# Patient Record
Sex: FEMALE | Race: WHITE | Employment: STUDENT | URBAN - METROPOLITAN AREA
[De-identification: names, ages, dates, MRNs, and addresses within clinical notes are randomized per-mention and may not be internally consistent; named-entity substitution may affect disease eponyms.]

---
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
## 2024-05-20 ENCOUNTER — LAB ENCOUNTER (OUTPATIENT)
Dept: LAB | Age: 19
End: 2024-05-20
Attending: PEDIATRICS

## 2024-05-20 DIAGNOSIS — Z13.228 SCREENING FOR METABOLIC DISORDER: ICD-10-CM

## 2024-05-20 DIAGNOSIS — J45.20 MILD INTERMITTENT ASTHMA WITHOUT COMPLICATION (HCC): ICD-10-CM

## 2024-05-20 DIAGNOSIS — Z13.0 SCREENING FOR DEFICIENCY ANEMIA: ICD-10-CM

## 2024-05-20 DIAGNOSIS — Z13.228 SCREENING FOR METABOLIC DISORDER: Primary | ICD-10-CM

## 2024-05-20 DIAGNOSIS — Z13.29 SCREENING FOR THYROID DISORDER: ICD-10-CM

## 2024-05-20 DIAGNOSIS — Z13.220 SCREENING FOR HYPERLIPIDEMIA: ICD-10-CM

## 2024-05-20 LAB
BASOPHILS # BLD AUTO: 0.04 X10(3) UL (ref 0–0.2)
BASOPHILS NFR BLD AUTO: 0.5 %
CHOLEST SERPL-MCNC: 124 MG/DL (ref ?–200)
DEPRECATED HBV CORE AB SER IA-ACNC: 13 NG/ML
EOSINOPHIL # BLD AUTO: 0.48 X10(3) UL (ref 0–0.7)
EOSINOPHIL NFR BLD AUTO: 6.5 %
ERYTHROCYTE [DISTWIDTH] IN BLOOD BY AUTOMATED COUNT: 12.9 %
FASTING PATIENT LIPID ANSWER: NO
HCT VFR BLD AUTO: 40.2 %
HDLC SERPL-MCNC: 66 MG/DL (ref 40–59)
HGB BLD-MCNC: 13.7 G/DL
IMM GRANULOCYTES # BLD AUTO: 0.01 X10(3) UL (ref 0–1)
IMM GRANULOCYTES NFR BLD: 0.1 %
IRON SATN MFR SERPL: 15 %
IRON SERPL-MCNC: 64 UG/DL
LDLC SERPL CALC-MCNC: 38 MG/DL (ref ?–100)
LYMPHOCYTES # BLD AUTO: 1.8 X10(3) UL (ref 1–4)
LYMPHOCYTES NFR BLD AUTO: 24.3 %
MAGNESIUM SERPL-MCNC: 2.1 MG/DL (ref 1.6–2.6)
MCH RBC QN AUTO: 30 PG (ref 26–34)
MCHC RBC AUTO-ENTMCNC: 34.1 G/DL (ref 31–37)
MCV RBC AUTO: 88.2 FL
MONOCYTES # BLD AUTO: 0.52 X10(3) UL (ref 0.1–1)
MONOCYTES NFR BLD AUTO: 7 %
NEUTROPHILS # BLD AUTO: 4.55 X10 (3) UL (ref 1.5–7.7)
NEUTROPHILS # BLD AUTO: 4.55 X10(3) UL (ref 1.5–7.7)
NEUTROPHILS NFR BLD AUTO: 61.6 %
NONHDLC SERPL-MCNC: 58 MG/DL (ref ?–130)
PLATELET # BLD AUTO: 246 10(3)UL (ref 150–450)
RBC # BLD AUTO: 4.56 X10(6)UL
TIBC SERPL-MCNC: 428 UG/DL (ref 240–450)
TRANSFERRIN SERPL-MCNC: 287 MG/DL (ref 200–360)
TRIGL SERPL-MCNC: 116 MG/DL (ref 30–149)
TSI SER-ACNC: 1.89 MIU/ML (ref 0.36–3.74)
VLDLC SERPL CALC-MCNC: 16 MG/DL (ref 0–30)
WBC # BLD AUTO: 7.4 X10(3) UL (ref 4–11)

## 2024-05-20 PROCEDURE — 82785 ASSAY OF IGE: CPT

## 2024-05-20 PROCEDURE — 86003 ALLG SPEC IGE CRUDE XTRC EA: CPT

## 2024-05-20 PROCEDURE — 83540 ASSAY OF IRON: CPT

## 2024-05-20 PROCEDURE — 83550 IRON BINDING TEST: CPT

## 2024-05-20 PROCEDURE — 85025 COMPLETE CBC W/AUTO DIFF WBC: CPT

## 2024-05-20 PROCEDURE — 84443 ASSAY THYROID STIM HORMONE: CPT

## 2024-05-20 PROCEDURE — 83735 ASSAY OF MAGNESIUM: CPT

## 2024-05-20 PROCEDURE — 80061 LIPID PANEL: CPT

## 2024-05-20 PROCEDURE — 82728 ASSAY OF FERRITIN: CPT

## 2024-05-20 PROCEDURE — 36415 COLL VENOUS BLD VENIPUNCTURE: CPT

## 2024-05-22 LAB
A ALTERNATA IGE QN: <0.1 KUA/L (ref ?–0.1)
A FUMIGATUS IGE QN: <0.1 KUA/L (ref ?–0.1)
AMER SYCAMORE IGE QN: <0.1 KUA/L (ref ?–0.1)
BERMUDA GRASS IGE QN: <0.1 KUA/L (ref ?–0.1)
BOXELDER IGE QN: <0.1 KUA/L (ref ?–0.1)
C HERBARUM IGE QN: <0.1 KUA/L (ref ?–0.1)
CALIF WALNUT IGE QN: <0.1 KUA/L (ref ?–0.1)
CAT DANDER IGE QN: 1.34 KUA/L (ref ?–0.1)
CMN PIGWEED IGE QN: <0.1 KUA/L (ref ?–0.1)
COMMON RAGWEED IGE QN: <0.1 KUA/L (ref ?–0.1)
COTTONWOOD IGE QN: 0.17 KUA/L (ref ?–0.1)
D FARINAE IGE QN: <0.1 KUA/L (ref ?–0.1)
D PTERONYSS IGE QN: <0.1 KUA/L (ref ?–0.1)
DOG DANDER IGE QN: 44.3 KUA/L (ref ?–0.1)
IGE SERPL-ACNC: 249 KU/L (ref 2–214)
M RACEMOSUS IGE QN: <0.1 KUA/L (ref ?–0.1)
MARSH ELDER IGE QN: <0.1 KUA/L (ref ?–0.1)
MOUSE EPITH IGE QN: 0.65 KUA/L (ref ?–0.1)
MT JUNIPER IGE QN: <0.1 KUA/L (ref ?–0.1)
P NOTATUM IGE QN: <0.1 KUA/L (ref ?–0.1)
PECAN/HICK TREE IGE QN: 0.11 KUA/L (ref ?–0.1)
ROACH IGE QN: <0.1 KUA/L (ref ?–0.1)
SALTWORT IGE QN: <0.1 KUA/L (ref ?–0.1)
SILVER BIRCH IGE QN: <0.1 KUA/L (ref ?–0.1)
TIMOTHY IGE QN: <0.1 KUA/L (ref ?–0.1)
WHITE ASH IGE QN: <0.1 KUA/L (ref ?–0.1)
WHITE ELM IGE QN: <0.1 KUA/L (ref ?–0.1)
WHITE MULBERRY IGE QN: <0.1 KUA/L (ref ?–0.1)
WHITE OAK IGE QN: <0.1 KUA/L (ref ?–0.1)

## 2024-06-24 ENCOUNTER — OFFICE VISIT (OUTPATIENT)
Facility: CLINIC | Age: 19
End: 2024-06-24
Payer: COMMERCIAL

## 2024-06-24 VITALS
SYSTOLIC BLOOD PRESSURE: 126 MMHG | OXYGEN SATURATION: 97 % | DIASTOLIC BLOOD PRESSURE: 62 MMHG | HEART RATE: 91 BPM | HEIGHT: 66.5 IN | BODY MASS INDEX: 23.82 KG/M2 | RESPIRATION RATE: 16 BRPM | WEIGHT: 150 LBS

## 2024-06-24 DIAGNOSIS — F51.3 SLEEP WALKING AND EATING: ICD-10-CM

## 2024-06-24 DIAGNOSIS — R55 SYNCOPE, UNSPECIFIED SYNCOPE TYPE: Primary | ICD-10-CM

## 2024-06-24 PROCEDURE — 3074F SYST BP LT 130 MM HG: CPT | Performed by: OTHER

## 2024-06-24 PROCEDURE — 99205 OFFICE O/P NEW HI 60 MIN: CPT | Performed by: OTHER

## 2024-06-24 PROCEDURE — 3078F DIAST BP <80 MM HG: CPT | Performed by: OTHER

## 2024-06-24 PROCEDURE — 3008F BODY MASS INDEX DOCD: CPT | Performed by: OTHER

## 2024-06-24 RX ORDER — TRAZODONE HYDROCHLORIDE 50 MG/1
1-2 TABLET, FILM COATED ORAL NIGHTLY PRN
COMMUNITY
Start: 2024-05-07 | End: 2024-07-17

## 2024-06-24 RX ORDER — HYDROXYZINE PAMOATE 25 MG/1
25 CAPSULE ORAL
COMMUNITY
Start: 2023-05-24

## 2024-06-24 RX ORDER — ALBUTEROL SULFATE 90 UG/1
2 AEROSOL, METERED RESPIRATORY (INHALATION) EVERY 4 HOURS PRN
COMMUNITY
Start: 2024-01-22

## 2024-06-24 RX ORDER — FLUTICASONE PROPIONATE AND SALMETEROL 250; 50 UG/1; UG/1
1 POWDER RESPIRATORY (INHALATION) AS DIRECTED
COMMUNITY
Start: 2024-05-07

## 2024-06-24 NOTE — PROGRESS NOTES
[facility]  SLEEP PROGRESS NOTE        HPI:   This is a 19 year old female coming in for   Chief Complaint   Patient presents with    Obstructive Sleep Apnea (KEYSHAWN)     Sleep consult        HPI: insomnia since December this year  No trigger  Takes trazodone as needed, has helped to fall asleep but not stay asleep  Goes to bed 9/10pm or 11/12, has long sleep latencies without trazodone  Now on 1 pill  Takes lexapro for 1.5 years  No snoring  No prior sleep study  Has h/o allergic rhinitis and triggered asthma    Back from work 1030, eats at 11pm  Shower   In bed before 12, up until 2am  Wakes at 6-7-8, stays in bed until 10pm    6 months ago her sleep was different    Takes lexapro and hyfralzidine  Trazodone at night    Patient: Sleep review of systems today: see form.      Pt  PCP:  MD Zia Perez Holly A, MD  [address] [place] [id number]           No data to display                    Past Medical History:    Allergic rhinitis    Arthritis    Asthma (HCC)    Excercise induced     History reviewed. No pertinent surgical history.  Social History:  Social History     Social History Narrative    Not on file     Social History     Socioeconomic History    Marital status: Single   Tobacco Use    Smoking status: Never     Passive exposure: Never    Smokeless tobacco: Never   Vaping Use    Vaping status: Never Used   Substance and Sexual Activity    Alcohol use: No    Drug use: Never     Social Determinants of Health      Received from [facility]     Family History:  Family History   Problem Relation Age of Onset    Heart Disease Paternal Grandfather     Cancer Paternal Grandfather     Anemia Mother     Asthma Mother     Diabetes Maternal Grandfather     Stroke Maternal Grandfather     Cancer Paternal Grandmother         Kidney cancer    Asthma Sister      Allergies:  Allergies   Allergen Reactions    Lidocaine OTHER (SEE COMMENTS)     Passed out and had a seizure after      Current Meds:  Current Outpatient Medications   Medication Sig Dispense Refill    albuterol 108 (90 Base) MCG/ACT Inhalation Aero Soln Inhale 2 puffs into the lungs every 4 (four) hours as needed for Wheezing.      fluticasone-salmeterol 250-50 MCG/ACT Inhalation Aerosol Powder, Breath Activated Inhale 1 puff into the lungs As Directed.      hydrOXYzine Pamoate 25 MG Oral Cap Take 1 capsule (25 mg total) by mouth.      traZODone 50 MG Oral Tab Take 1-2 tablets ( mg total) by mouth nightly as needed.      montelukast 10 MG Oral Tab Take 10 mg by mouth daily. (Patient not taking: Reported on 6/24/2024)      Meloxicam 15 MG Oral Tab Take 1 tablet (15 mg total) by mouth daily. (Patient not taking: Reported on 6/15/2022) 14 tablet 1      Counseling given: Not Answered         Problem List:  Patient Active Problem List   Diagnosis    Chondromalacia of lateral tibial plateau, left    Sleep walking and eating    Syncope       REVIEW OF SYSTEMS:   Review of Systems    EXAM:   /62 (BP Location: Right arm, Patient Position: Sitting, Cuff Size: adult)   Pulse 91   Resp 16   Ht 5' 6.5\" (1.689 m)   Wt 150 lb (68 kg)   SpO2 97%   BMI 23.85 kg/m²  Estimated body mass index is 23.85 kg/m² as calculated from the following:    Height as of this encounter: 5' 6.5\" (1.689 m).    Weight as of this encounter: 150 lb (68 kg).   Neck in inches:      Wt Readings from Last 6 Encounters:   06/24/24 150 lb (68 kg)   12/28/22 160 lb (72.6 kg) (88%, Z= 1.20)*   05/18/22 145 lb (65.8 kg) (79%, Z= 0.82)*   05/16/22 145 lb (65.8 kg) (79%, Z= 0.82)*   01/13/19 136 lb (61.7 kg) (80%, Z= 0.85)*   02/01/17 130 lb (59 kg) (87%, Z= 1.12)*     * Growth percentiles are based on CDC (Girls, 2-20 Years) data.     BP Readings from Last 3 Encounters:   06/24/24 126/62   05/16/22 112/72   01/13/19 94/70 (7%, Z = -1.48 /  67%, Z = 0.44)*     *BP percentiles are based on the 2017 AAP Clinical Practice Guideline for girls     Pulse Readings  from Last 3 Encounters:   06/24/24 91   05/16/22 81   01/13/19 82     SpO2 Readings from Last 3 Encounters:   06/24/24 97%   05/16/22 99%   01/13/19 99%      Patient weight not recorded    Vital signs reviewed.  Physical Exam    ASSESSMENT AND PLAN:   1. Syncope, unspecified syncope type    2. Sleep walking and eating    There are no Patient Instructions on file for this visit.    Independent interpretation of Sleep Download as defined above.  Continue with Rx management of Sleep apnea with PAP therapy.    COMPLIANCE is required by insurance for 4 hours a night most nights of the week.    Advised if still with sleep apnea and not using CPAP has a 7 fold increase in risk of heart attack, stroke, abnormal heart rhythm  and death,  increased risk of driving accidents.     Advised to refrain from driving when sleepy.      Recommend weight loss, and maintain and optimal BMI with Exercise 30 minutes most days to target heart rate .     Advised patient to change filters,masks,hoses  and tubes and equiptment on a  regular schedule.    Filters and seals shall be changed every 1 month,  Hoses every 3 months,   CPAP mask and humidifier chamber changed every 6 month  with the durable medical equipment provider.         Meds & Refills for this Visit:  Requested Prescriptions      No prescriptions requested or ordered in this encounter       Outcome: Parent verbalizes understanding. Parent is notified to call with any questions, complications, allergies, or worsening or changing symptoms.  Parent is to call with any side effects or complications from the treatments as a result of today.     \" This note was created utilizing Dragon speech recognition software.  Please excuse any grammatical errors. Call my office if you have any questions regarding this note. \"     Lindsey Sharma,   6/24/2024  1:50 PM

## 2024-07-16 NOTE — PROGRESS NOTES
[facility] PULMONARY  SLEEP PROGRESS NOTE        HPI:   This is a 19 year old female coming in for   Chief Complaint   Patient presents with    Obstructive Sleep Apnea (KEYSHAWN)     syncope       HPI:   This is a 19 year old female who presents with the following symptoms, risk factors, behaviors or other items associated with sleep problems.    Sleep Apnea:   restless sleep; no signs of sleep apnea  Insomnia:  difficulty staying asleep; restless sleep; not getting enough sleep; anxiety  Restless Leg:  urge to move legs when trying to sleep; moving leges helps relieve discomfort  Parasomnias:   sleep eating  Daytime Problems:  sleepiness; fatigue; irritable/lagunas; napping on purpose    The patient's [place] Sleepiness score is 3/24.    No changes takes trazodone 50mg ,   Falls asleep at 11, wakes 3 times down, up by 730 to 10  Trazodone helps her fall asleep, does not take it all the time    Last episode of syncope since last year in high school  2 -3 episodes, one episode 30 seconds,   5 hours episode post phrenectomy, 3 hour episode in science class , while dissecting frog    Last episode sleep walking while in high school  Dreams all the time, vivid  No sleep paralysis  No hallucinations    ESS 9/24  When she laughs she feels weak  Moves all the time in her sleep, tossing and turning through the night        Patient: Sleep review of systems today: see form.      Pt  PCP:  Liudmila Lizarraga MD  No referring provider defined for this encounter.           No data to display                    Past Medical History:    Allergic rhinitis    Arthritis    Asthma (HCC)    Excercise induced     History reviewed. No pertinent surgical history.  Social History:  Social History     Social History Narrative    Not on file     Social History     Socioeconomic History    Marital status: Single   Tobacco Use    Smoking status: Never     Passive exposure: Never    Smokeless tobacco: Never   Vaping Use    Vaping status: Never Used   Substance  and Sexual Activity    Alcohol use: No    Drug use: Never     Social Determinants of Health      Received [facility]     Family History:  Family History   Problem Relation Age of Onset    Heart Disease Paternal Grandfather     Cancer Paternal Grandfather     Anemia Mother     Asthma Mother     Diabetes Maternal Grandfather     Stroke Maternal Grandfather     Cancer Paternal Grandmother         Kidney cancer    Asthma Sister      Allergies:  Allergies   Allergen Reactions    Lidocaine OTHER (SEE COMMENTS)     Passed out and had a seizure after     Current Meds:  Current Outpatient Medications   Medication Sig Dispense Refill    albuterol 108 (90 Base) MCG/ACT Inhalation Aero Soln Inhale 2 puffs into the lungs every 4 (four) hours as needed for Wheezing.      fluticasone-salmeterol 250-50 MCG/ACT Inhalation Aerosol Powder, Breath Activated Inhale 1 puff into the lungs As Directed.      hydrOXYzine Pamoate 25 MG Oral Cap Take 1 capsule (25 mg total) by mouth.      traZODone 50 MG Oral Tab Take 1-2 tablets ( mg total) by mouth nightly as needed.      montelukast 10 MG Oral Tab Take 1 tablet (10 mg total) by mouth daily.      Meloxicam 15 MG Oral Tab Take 1 tablet (15 mg total) by mouth daily. 14 tablet 1      Counseling given: Not Answered         Problem List:  Patient Active Problem List   Diagnosis    Chondromalacia of lateral tibial plateau, left    Sleep walking and eating    Syncope       REVIEW OF SYSTEMS:   Review of Systems    EXAM:   /60 (BP Location: Right arm, Patient Position: Sitting, Cuff Size: adult)   Pulse 85   Resp 16   Ht 5' 6.5\" (1.689 m)   Wt 150 lb (68 kg)   SpO2 95%   BMI 23.85 kg/m²  Estimated body mass index is 23.85 kg/m² as calculated from the following:    Height as of this encounter: 5' 6.5\" (1.689 m).    Weight as of this encounter: 150 lb (68 kg).   Neck in inches:      Wt Readings from Last 6 Encounters:   07/17/24 150 lb (68 kg)   06/24/24 150 lb  (68 kg)   12/28/22 160 lb (72.6 kg) (88%, Z= 1.20)*   05/18/22 145 lb (65.8 kg) (79%, Z= 0.82)*   05/16/22 145 lb (65.8 kg) (79%, Z= 0.82)*   01/13/19 136 lb (61.7 kg) (80%, Z= 0.85)*     * Growth percentiles are based on [facility] (Girls, 2-20 Years) data.     BP Readings from Last 3 Encounters:   07/17/24 114/60   06/24/24 126/62   05/16/22 112/72     Pulse Readings from Last 3 Encounters:   07/17/24 85   06/24/24 91   05/16/22 81     SpO2 Readings from Last 3 Encounters:   07/17/24 95%   06/24/24 97%   05/16/22 99%      Ideal body weight: 60.5 kg (133 lb 4.3 oz)  Adjusted ideal body weight: 63.5 kg (139 lb 15.4 oz)    Vital signs reviewed.  Physical Exam    ASSESSMENT AND PLAN:   1. Hypersomnia, PLMD    Evaluate with DX/MSLT  Asked to hold lexapro for study , will discuss with her doctor    2. Insomnia, unspecified type    Continue trazodone 50 mg 1-2 tabs    3.Syncope      Time spent 60minutes  There are no Patient Instructions on file for this visit.            Meds & Refills for this Visit:  Requested Prescriptions     Pending Prescriptions Disp Refills    traZODone 50 MG Oral Tab 60 tablet 2     Sig: Take 1 tablet (50 mg total) by mouth nightly.    traZODone 50 MG Oral Tab 60 tablet 2     Sig: Take 1-2 tablets ( mg total) by mouth nightly as needed.       Outcome: Parent verbalizes understanding. Parent is notified to call with any questions, complications, allergies, or worsening or changing symptoms.  Parent is to call with any side effects or complications from the treatments as a result of today.     \" This note was created utilizing Dragon speech recognition software.  Please excuse any grammatical errors. Call my office if you have any questions regarding this note. \"     Lindsey Sharma DO  7/17/2024  10:26 AM

## 2024-07-17 ENCOUNTER — OFFICE VISIT (OUTPATIENT)
Facility: CLINIC | Age: 19
End: 2024-07-17
Payer: COMMERCIAL

## 2024-07-17 VITALS
HEIGHT: 66.5 IN | DIASTOLIC BLOOD PRESSURE: 60 MMHG | HEART RATE: 85 BPM | SYSTOLIC BLOOD PRESSURE: 114 MMHG | BODY MASS INDEX: 23.82 KG/M2 | RESPIRATION RATE: 16 BRPM | OXYGEN SATURATION: 95 % | WEIGHT: 150 LBS

## 2024-07-17 DIAGNOSIS — G47.61 PERIODIC LIMB MOVEMENT DISORDER (PLMD): ICD-10-CM

## 2024-07-17 DIAGNOSIS — G47.10 HYPERSOMNIA: Primary | ICD-10-CM

## 2024-07-17 DIAGNOSIS — G47.00 INSOMNIA, UNSPECIFIED TYPE: ICD-10-CM

## 2024-07-17 PROCEDURE — 99215 OFFICE O/P EST HI 40 MIN: CPT | Performed by: OTHER

## 2024-07-17 PROCEDURE — 3078F DIAST BP <80 MM HG: CPT | Performed by: OTHER

## 2024-07-17 PROCEDURE — 3008F BODY MASS INDEX DOCD: CPT | Performed by: OTHER

## 2024-07-17 PROCEDURE — 3074F SYST BP LT 130 MM HG: CPT | Performed by: OTHER

## 2024-07-18 PROBLEM — G47.10 HYPERSOMNIA: Status: ACTIVE | Noted: 2024-07-18

## 2024-07-18 PROBLEM — G47.00 INSOMNIA: Status: ACTIVE | Noted: 2024-07-18

## 2024-07-18 PROBLEM — G47.61 PERIODIC LIMB MOVEMENT DISORDER (PLMD): Status: ACTIVE | Noted: 2024-07-18

## 2024-07-18 RX ORDER — TRAZODONE HYDROCHLORIDE 50 MG/1
50 TABLET ORAL NIGHTLY
Qty: 60 TABLET | Refills: 2 | Status: SHIPPED | OUTPATIENT
Start: 2024-07-18

## 2024-07-18 RX ORDER — TRAZODONE HYDROCHLORIDE 50 MG/1
TABLET ORAL NIGHTLY PRN
Qty: 60 TABLET | Refills: 2 | Status: SHIPPED | OUTPATIENT
Start: 2024-07-18

## 2025-05-19 DIAGNOSIS — R55 SYNCOPE, UNSPECIFIED SYNCOPE TYPE: Primary | ICD-10-CM

## (undated) NOTE — LETTER
Date & Time: 5/16/2022, 9:19 PM  Patient: Paradise Jaramillo  Encounter Provider(s):    On File, TACOS RUDD Attending  Ronny Moreno MD       To Whom It May Concern:    Shanna Dickson was seen and treated in our department on 5/16/2022.  She may not participate in gym until follow up with  or orthopedist.    If you have any questions or concerns, please do not hesitate to call.        _____________________________  Physician/APC Signature

## (undated) NOTE — ED AVS SNAPSHOT
[facility] in [address] [facility]    Phone:  [phone]    Fax:  [address]   MRN: [id number]    Department:  [facility] in [place]   Date of Visi IF THERE IS ANY CHANGE OR WORSENING OF YOUR CONDITION, CALL YOUR PRIMARY CARE PHYSICIAN AT ONCE OR RETURN IMMEDIATELY TO THE EMERGENCY DEPARTMENT.     If you have been prescribed any medication(s), please fill your prescription right away and begin taking t

## (undated) NOTE — ED AVS SNAPSHOT
[facility] Emergency Department in [address] [facility]    Phone:  [phone]    Fax:  [address]   MRN: [id number]    Department:  [facility] Emergency Department in [place]   Date of Visi nuestro adminstrador de clayton al [phone]    Expect to receive an electronic request (by e-mail or text) to complete a self-assessment the day after your visit. You may also receive a call from our patient liason soon after your visit.  Also, some p [facility] [address]  ([address]) [address] [facility] [phone] [address] [facility] [address]. ([address] [place] [address]) [phone] [address] [address] [address] ( Call [phone] for help. [employer] is NOT to be used for urgent needs. For medical emergencies, dial 911.

## (undated) NOTE — LETTER
February 1, 2017    Patient: Jonathan Miller   Date of Visit: 2/1/2017       To Whom It May Concern:    Lillian Braswell was seen and treated in our emergency department on 2/1/2017. She should not return to school until 2/3/17.     If you have any que